# Patient Record
Sex: MALE | Race: WHITE | URBAN - METROPOLITAN AREA
[De-identification: names, ages, dates, MRNs, and addresses within clinical notes are randomized per-mention and may not be internally consistent; named-entity substitution may affect disease eponyms.]

---

## 2022-01-21 ENCOUNTER — HOSPITAL ENCOUNTER (EMERGENCY)
Facility: CLINIC | Age: 37
Discharge: HOME | End: 2022-01-21
Attending: EMERGENCY MEDICINE | Admitting: EMERGENCY MEDICINE
Payer: COMMERCIAL

## 2022-01-21 VITALS
HEIGHT: 71 IN | HEART RATE: 85 BPM | TEMPERATURE: 97.9 F | OXYGEN SATURATION: 98 % | SYSTOLIC BLOOD PRESSURE: 123 MMHG | BODY MASS INDEX: 24.5 KG/M2 | DIASTOLIC BLOOD PRESSURE: 65 MMHG | WEIGHT: 175 LBS | RESPIRATION RATE: 16 BRPM

## 2022-01-21 DIAGNOSIS — K64.4 EXTERNAL HEMORRHOID, BLEEDING: ICD-10-CM

## 2022-01-21 DIAGNOSIS — K64.5 THROMBOSED EXTERNAL HEMORRHOIDS: ICD-10-CM

## 2022-01-21 PROCEDURE — 99284 EMERGENCY DEPT VISIT MOD MDM: CPT

## 2022-01-21 RX ORDER — SERTRALINE HYDROCHLORIDE 100 MG/1
100 TABLET, FILM COATED ORAL DAILY
COMMUNITY

## 2022-01-21 RX ORDER — HYDROCORTISONE ACETATE 25 MG/1
25 SUPPOSITORY RECTAL 2 TIMES DAILY
Qty: 10 SUPPOSITORY | Refills: 0 | Status: SHIPPED | OUTPATIENT
Start: 2022-01-21 | End: 2022-01-26

## 2022-01-21 RX ORDER — ATOMOXETINE 25 MG/1
50 CAPSULE ORAL DAILY
COMMUNITY

## 2022-01-21 ASSESSMENT — MIFFLIN-ST. JEOR: SCORE: 1735.92

## 2022-01-22 NOTE — ED PROVIDER NOTES
History     Chief Complaint:  Rectal Bleeding (difficulty with BM for past three days - pt reports bella red blood with blot clot with wiping today. )     HPI   Enzo Sanchez is a 36 year old male who presents with rectal bleeding.  He reports that as a part of his New Year's resolutions he has been eating a high-protein diet.  Over the past few weeks he has struggled with constipation.  He has had some bowel movements.  Today he noticed bella red blood with a bowel movement.  He has had some discomfort in the rectal area but this seems to have resolved after he wiped.  He continues to have bleeding after the bowel movement and on his way to the emergency department.  He has passed some clots that he describes as small and the size of rice.  He is also noticed what feels like the knot of the balloon protruding from his rectum.  He feels like it is firmer and a different consistency than previous hemorrhoids.  He used the infrared thermometer and it said he had a fever tonight.  He did a virtual visit with his [facility] service and he was told to come to the emergency department.  He is not having any symptoms of COVID.  He has had COVID and recovered and he is also vaccinated.  He is not having any coughing, shortness of breath, skin symptoms, abdominal pain or other symptoms.  He did not take any antipyretic at home.     ROS:  Review of Systems   As noted per HPI.  Remainder of a 10 point review of systems was negative.    Allergies:  No Known Allergies     Medications:      atomoxetine (STRATTERA) 25 MG capsule  sertraline (ZOLOFT) 100 MG tablet        Past Medical History:    Noncontributory    Past Surgical History:    History reviewed. No pertinent surgical history.     Family History:    Noncontributory    Social History:  Has four boy children under the age of 10.  Works for [employer] with [employer].   reports that he has never smoked. He has never used smokeless tobacco. He reports current  "alcohol use. He reports that he does not use drugs.  PCP: Brock Frye     Physical Exam   Patient Vitals for the past 24 hrs:   BP Temp Temp src Pulse Resp SpO2 Height Weight   01/21/22 1932 123/65 97.9  F (36.6  C) Temporal 85 16 98 % 1.803 m (5' 11\") 79.4 kg (175 lb)        Physical Exam  General: Well-nourished, no acute distress  Eyes: PERRL, conjunctivae pink no scleral icterus or conjunctival injection  ENT:  Moist mucus membranes  Respiratory:  No respiratory distress  CV: Normal rate   Rectal: Thrombosed external hemorrhoid at approximately 3:00.  Some blood.  No arterial or pulsatile bleeding.  No fluctuance or abscess apparent of rectum.    Skin: Warm, dry.  No rashes or petechiae.  No signs of cellulitis surrounding the rectum.  Musculoskeletal: No peripheral edema or calf tenderness  Neuro: Alert and oriented to person/place/time  Psychiatric: Normal affect    Emergency Department Course     Emergency Department Course:    Reviewed:  I reviewed nursing notes, vitals, past medical history and Care Everywhere    Assessments:  I obtained history and examined the patient as noted above.     Disposition:  The patient was discharged to home.     Impression & Plan      Covid-19  Enzo Sanchez was evaluated during a global COVID-19 pandemic, which necessitated consideration that the patient might be at risk for infection with the SARS-CoV-2 virus that causes COVID-19.   Applicable protocols for evaluation were followed during the patient's care.   COVID-19 was considered as part of the patient's evaluation.     Medical Decision Making:  Enzo Sanchez is a 36 year old male who comes with rectal bleeding.  History and physical examination are consistent with a thrombosed and bleeding external hemorrhoid.  Per the history, the amount of bleeding is relatively small.  He has reassuring vital signs and after discussion we decided to defer on blood work or laboratory testing.  No reason to suspect that " he has a thrombocytopenia.  Of note, he did have an elevated temperature reading at home on infrared thermometer.  Here he has normal temperature x2.  He had not taken any antipyretics.  I suspect this may have been a falsely elevated reading.  I do not see any signs of a perirectal or perianal abscess.  No other symptoms to suggest systemic infection.  I recommended and prescribed hydrocortisone suppositories.  He is to continue doing sitz bath's.  He requested a prescription in the pocket should he develop a fever at home and this was provided though I counseled him not to take it unless he does have a true fever and discussed return precautions and the importance of reevaluation if he has any signs or symptoms of perirectal or perianal abscess or other severe infection.  He is asked to return if heavy bleeding or worsening.  At this time, with reasonable clinical confidence, I feel he is safe for discharge home.      Diagnosis:    ICD-10-CM    1. Thrombosed external hemorrhoids  K64.5    2. External hemorrhoid, bleeding  K64.4         Discharge Medications:  Discharge Medication List as of 1/21/2022  8:50 PM      START taking these medications    Details   amoxicillin-clavulanate (AUGMENTIN) 875-125 MG tablet Take 1 tablet by mouth 2 times daily for 7 days, Disp-14 tablet, R-0, Local Print      hydrocortisone (ANUSOL-HC) 25 MG suppository Place 1 suppository (25 mg) rectally 2 times daily for 5 days, Disp-10 suppository, R-0, Local Print              1/21/2022          Jasmin Hlul MD  01/21/22 3410

## 2022-01-22 NOTE — DISCHARGE INSTRUCTIONS
*You may resume diet and activities. Recommend sitz baths daily.   *Take medications as prescribed.  Anusol suppositories. Augmentin only if fever or surrounding redness.  Continue your current medications.  *Followup with colorectal surgery to ensure resolution and discuss treatment options.  *Return if you develop high fever, worsening pain, spreading redness or warmth, drainage of pus, faint or feel like you will faint or become worse in any way.

## 2022-01-22 NOTE — ED TRIAGE NOTES
difficulty with BM for past three days - pt reports bella red blood with blot clot with wiping today.